# Patient Record
Sex: FEMALE | Race: WHITE | NOT HISPANIC OR LATINO | URBAN - METROPOLITAN AREA
[De-identification: names, ages, dates, MRNs, and addresses within clinical notes are randomized per-mention and may not be internally consistent; named-entity substitution may affect disease eponyms.]

---

## 2017-01-01 ENCOUNTER — EMERGENCY (EMERGENCY)
Facility: HOSPITAL | Age: 32
LOS: 1 days | Discharge: HOME | End: 2017-01-01
Attending: EMERGENCY MEDICINE | Admitting: EMERGENCY MEDICINE
Payer: OTHER MISCELLANEOUS

## 2017-01-01 VITALS
SYSTOLIC BLOOD PRESSURE: 126 MMHG | RESPIRATION RATE: 18 BRPM | OXYGEN SATURATION: 99 % | HEART RATE: 84 BPM | TEMPERATURE: 98 F | DIASTOLIC BLOOD PRESSURE: 73 MMHG

## 2017-01-01 PROCEDURE — 99283 EMERGENCY DEPT VISIT LOW MDM: CPT

## 2017-01-01 NOTE — ED PROVIDER NOTE - ATTENDING CONTRIBUTION TO CARE
Pt was seen and evaluated by me. 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 Pt notes while trying to recap the needle. Pt states she washed out the area immediately. Pt was seen and evaluated by me. 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 Pt notes while trying to recap the needle getting stuck to left 2nd finger. Pt states she washed out the area immediately. Pt was using a 27 dilan needed for a SQ injection. Pt denies any fever, chills, nausea, vomiting, SOB, chest pain, or abd pain. Abrasion noted to left 2nd finger. No active bleeding.

## 2017-01-01 NOTE — ED PROVIDER NOTE - OBJECTIVE STATEMENT
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 Area was immediately washed. 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 Area was immediately washed. Pt used at 27gage needle and gave a SQ injection.

## 2018-07-10 NOTE — ED PROVIDER NOTE - CPE EDP GASTRO NORM
normal... O-T Plasty Text: The defect edges were debeveled with a #15 scalpel blade.  Given the location of the defect, shape of the defect and the proximity to free margins an O-T plasty was deemed most appropriate.  Using a sterile surgical marker, an appropriate O-T plasty was drawn incorporating the defect and placing the expected incisions within the relaxed skin tension lines where possible.    The area thus outlined was incised deep to adipose tissue with a #15 scalpel blade.  The skin margins were undermined to an appropriate distance in all directions utilizing iris scissors.

## 2018-11-01 ENCOUNTER — RESULT REVIEW (OUTPATIENT)
Age: 33
End: 2018-11-01

## 2018-11-26 ENCOUNTER — APPOINTMENT (OUTPATIENT)
Dept: ANTEPARTUM | Facility: CLINIC | Age: 33
End: 2018-11-26
Payer: COMMERCIAL

## 2018-11-26 ENCOUNTER — ASOB RESULT (OUTPATIENT)
Age: 33
End: 2018-11-26

## 2018-11-26 PROCEDURE — 36416 COLLJ CAPILLARY BLOOD SPEC: CPT

## 2018-11-26 PROCEDURE — 76813 OB US NUCHAL MEAS 1 GEST: CPT

## 2018-11-26 PROCEDURE — 76801 OB US < 14 WKS SINGLE FETUS: CPT

## 2019-01-16 ENCOUNTER — ASOB RESULT (OUTPATIENT)
Age: 34
End: 2019-01-16

## 2019-01-16 ENCOUNTER — APPOINTMENT (OUTPATIENT)
Dept: ANTEPARTUM | Facility: CLINIC | Age: 34
End: 2019-01-16
Payer: COMMERCIAL

## 2019-01-16 PROCEDURE — 76817 TRANSVAGINAL US OBSTETRIC: CPT | Mod: 59

## 2019-01-16 PROCEDURE — 76811 OB US DETAILED SNGL FETUS: CPT

## 2019-04-09 ENCOUNTER — OUTPATIENT (OUTPATIENT)
Dept: OUTPATIENT SERVICES | Facility: HOSPITAL | Age: 34
LOS: 1 days | End: 2019-04-09
Payer: COMMERCIAL

## 2019-04-09 DIAGNOSIS — O26.899 OTHER SPECIFIED PREGNANCY RELATED CONDITIONS, UNSPECIFIED TRIMESTER: ICD-10-CM

## 2019-04-09 DIAGNOSIS — Z3A.00 WEEKS OF GESTATION OF PREGNANCY NOT SPECIFIED: ICD-10-CM

## 2019-04-09 LAB
APTT BLD: 25 SEC — LOW (ref 27.5–36.3)
BLD GP AB SCN SERPL QL: NEGATIVE — SIGNIFICANT CHANGE UP
FIBRINOGEN PPP-MCNC: 658 MG/DL — HIGH (ref 350–510)
HCT VFR BLD CALC: 31.9 % — LOW (ref 34.5–45)
HGB BLD-MCNC: 11.7 G/DL — SIGNIFICANT CHANGE UP (ref 11.5–15.5)
INR BLD: 1 RATIO — SIGNIFICANT CHANGE UP (ref 0.88–1.16)
MCHC RBC-ENTMCNC: 32.4 PG — SIGNIFICANT CHANGE UP (ref 27–34)
MCHC RBC-ENTMCNC: 36.7 GM/DL — HIGH (ref 32–36)
MCV RBC AUTO: 88.3 FL — SIGNIFICANT CHANGE UP (ref 80–100)
PLATELET # BLD AUTO: 193 K/UL — SIGNIFICANT CHANGE UP (ref 150–400)
PROTHROM AB SERPL-ACNC: 11.4 SEC — SIGNIFICANT CHANGE UP (ref 10–12.9)
RBC # BLD: 3.62 M/UL — LOW (ref 3.8–5.2)
RBC # FLD: 11.9 % — SIGNIFICANT CHANGE UP (ref 10.3–14.5)
RH IG SCN BLD-IMP: POSITIVE — SIGNIFICANT CHANGE UP
WBC # BLD: 8.3 K/UL — SIGNIFICANT CHANGE UP (ref 3.8–10.5)
WBC # FLD AUTO: 8.3 K/UL — SIGNIFICANT CHANGE UP (ref 3.8–10.5)

## 2019-04-09 PROCEDURE — 59025 FETAL NON-STRESS TEST: CPT

## 2019-04-09 PROCEDURE — 86901 BLOOD TYPING SEROLOGIC RH(D): CPT

## 2019-04-09 PROCEDURE — 86850 RBC ANTIBODY SCREEN: CPT

## 2019-04-09 PROCEDURE — 85027 COMPLETE CBC AUTOMATED: CPT

## 2019-04-09 PROCEDURE — 86900 BLOOD TYPING SEROLOGIC ABO: CPT

## 2019-04-09 PROCEDURE — G0463: CPT

## 2019-04-09 PROCEDURE — 85384 FIBRINOGEN ACTIVITY: CPT

## 2019-04-09 PROCEDURE — 85460 HEMOGLOBIN FETAL: CPT

## 2019-04-09 PROCEDURE — 85610 PROTHROMBIN TIME: CPT

## 2019-04-09 PROCEDURE — 85730 THROMBOPLASTIN TIME PARTIAL: CPT

## 2019-04-10 ENCOUNTER — ASOB RESULT (OUTPATIENT)
Age: 34
End: 2019-04-10

## 2019-04-10 ENCOUNTER — APPOINTMENT (OUTPATIENT)
Dept: ANTEPARTUM | Facility: CLINIC | Age: 34
End: 2019-04-10
Payer: COMMERCIAL

## 2019-04-10 LAB — KLEIHAUER-BETKE CALCULATION: 0 % — SIGNIFICANT CHANGE UP (ref 0–0.3)

## 2019-04-10 PROCEDURE — 76818 FETAL BIOPHYS PROFILE W/NST: CPT | Mod: 26

## 2019-04-10 PROCEDURE — 76816 OB US FOLLOW-UP PER FETUS: CPT | Mod: 26

## 2019-06-11 ENCOUNTER — APPOINTMENT (OUTPATIENT)
Dept: ANTEPARTUM | Facility: CLINIC | Age: 34
End: 2019-06-11
Payer: COMMERCIAL

## 2019-06-11 ENCOUNTER — OUTPATIENT (OUTPATIENT)
Dept: OUTPATIENT SERVICES | Facility: HOSPITAL | Age: 34
LOS: 1 days | End: 2019-06-11
Payer: COMMERCIAL

## 2019-06-11 ENCOUNTER — ASOB RESULT (OUTPATIENT)
Age: 34
End: 2019-06-11

## 2019-06-11 DIAGNOSIS — Z01.818 ENCOUNTER FOR OTHER PREPROCEDURAL EXAMINATION: ICD-10-CM

## 2019-06-11 PROCEDURE — 76818 FETAL BIOPHYS PROFILE W/NST: CPT | Mod: 26

## 2019-06-11 PROCEDURE — 76818 FETAL BIOPHYS PROFILE W/NST: CPT

## 2019-06-11 PROCEDURE — 76816 OB US FOLLOW-UP PER FETUS: CPT

## 2019-06-14 ENCOUNTER — APPOINTMENT (OUTPATIENT)
Dept: ANTEPARTUM | Facility: CLINIC | Age: 34
End: 2019-06-14
Payer: COMMERCIAL

## 2019-06-14 ENCOUNTER — OUTPATIENT (OUTPATIENT)
Dept: OUTPATIENT SERVICES | Facility: HOSPITAL | Age: 34
LOS: 1 days | End: 2019-06-14
Payer: COMMERCIAL

## 2019-06-14 ENCOUNTER — ASOB RESULT (OUTPATIENT)
Age: 34
End: 2019-06-14

## 2019-06-14 DIAGNOSIS — Z01.818 ENCOUNTER FOR OTHER PREPROCEDURAL EXAMINATION: ICD-10-CM

## 2019-06-14 PROCEDURE — 76818 FETAL BIOPHYS PROFILE W/NST: CPT | Mod: 26

## 2019-06-14 PROCEDURE — 76818 FETAL BIOPHYS PROFILE W/NST: CPT

## 2019-06-16 ENCOUNTER — OUTPATIENT (OUTPATIENT)
Dept: OUTPATIENT SERVICES | Facility: HOSPITAL | Age: 34
LOS: 1 days | End: 2019-06-16
Payer: COMMERCIAL

## 2019-06-16 DIAGNOSIS — O26.899 OTHER SPECIFIED PREGNANCY RELATED CONDITIONS, UNSPECIFIED TRIMESTER: ICD-10-CM

## 2019-06-16 DIAGNOSIS — Z3A.00 WEEKS OF GESTATION OF PREGNANCY NOT SPECIFIED: ICD-10-CM

## 2019-06-16 PROCEDURE — G0463: CPT

## 2019-06-16 PROCEDURE — 59025 FETAL NON-STRESS TEST: CPT

## 2019-06-16 PROCEDURE — 59025 FETAL NON-STRESS TEST: CPT | Mod: 26

## 2019-06-17 ENCOUNTER — INPATIENT (INPATIENT)
Facility: HOSPITAL | Age: 34
LOS: 1 days | Discharge: HOME | End: 2019-06-19
Attending: OBSTETRICS & GYNECOLOGY | Admitting: OBSTETRICS & GYNECOLOGY
Payer: COMMERCIAL

## 2019-06-17 VITALS
SYSTOLIC BLOOD PRESSURE: 131 MMHG | TEMPERATURE: 98 F | RESPIRATION RATE: 16 BRPM | HEART RATE: 86 BPM | OXYGEN SATURATION: 98 % | DIASTOLIC BLOOD PRESSURE: 87 MMHG

## 2019-06-17 DIAGNOSIS — Z34.80 ENCOUNTER FOR SUPERVISION OF OTHER NORMAL PREGNANCY, UNSPECIFIED TRIMESTER: ICD-10-CM

## 2019-06-17 DIAGNOSIS — Z3A.00 WEEKS OF GESTATION OF PREGNANCY NOT SPECIFIED: ICD-10-CM

## 2019-06-17 DIAGNOSIS — O26.899 OTHER SPECIFIED PREGNANCY RELATED CONDITIONS, UNSPECIFIED TRIMESTER: ICD-10-CM

## 2019-06-17 LAB
BASOPHILS # BLD AUTO: 0 K/UL — SIGNIFICANT CHANGE UP (ref 0–0.2)
BASOPHILS NFR BLD AUTO: 0.2 % — SIGNIFICANT CHANGE UP (ref 0–2)
BLD GP AB SCN SERPL QL: NEGATIVE — SIGNIFICANT CHANGE UP
EOSINOPHIL # BLD AUTO: 0 K/UL — SIGNIFICANT CHANGE UP (ref 0–0.5)
EOSINOPHIL NFR BLD AUTO: 0.3 % — SIGNIFICANT CHANGE UP (ref 0–6)
HCT VFR BLD CALC: 36.3 % — SIGNIFICANT CHANGE UP (ref 34.5–45)
HGB BLD-MCNC: 12.6 G/DL — SIGNIFICANT CHANGE UP (ref 11.5–15.5)
LYMPHOCYTES # BLD AUTO: 15.4 % — SIGNIFICANT CHANGE UP (ref 13–44)
LYMPHOCYTES # BLD AUTO: 2.1 K/UL — SIGNIFICANT CHANGE UP (ref 1–3.3)
MCHC RBC-ENTMCNC: 29 PG — SIGNIFICANT CHANGE UP (ref 27–34)
MCHC RBC-ENTMCNC: 34.7 GM/DL — SIGNIFICANT CHANGE UP (ref 32–36)
MCV RBC AUTO: 83.5 FL — SIGNIFICANT CHANGE UP (ref 80–100)
MONOCYTES # BLD AUTO: 0.8 K/UL — SIGNIFICANT CHANGE UP (ref 0–0.9)
MONOCYTES NFR BLD AUTO: 5.6 % — SIGNIFICANT CHANGE UP (ref 2–14)
NEUTROPHILS # BLD AUTO: 10.6 K/UL — HIGH (ref 1.8–7.4)
NEUTROPHILS NFR BLD AUTO: 78.5 % — HIGH (ref 43–77)
PLATELET # BLD AUTO: 175 K/UL — SIGNIFICANT CHANGE UP (ref 150–400)
RBC # BLD: 4.35 M/UL — SIGNIFICANT CHANGE UP (ref 3.8–5.2)
RBC # FLD: 13.7 % — SIGNIFICANT CHANGE UP (ref 10.3–14.5)
RH IG SCN BLD-IMP: POSITIVE — SIGNIFICANT CHANGE UP
WBC # BLD: 13.5 K/UL — HIGH (ref 3.8–10.5)
WBC # FLD AUTO: 13.5 K/UL — HIGH (ref 3.8–10.5)

## 2019-06-17 RX ORDER — AMPICILLIN TRIHYDRATE 250 MG
1 CAPSULE ORAL EVERY 4 HOURS
Refills: 0 | Status: DISCONTINUED | OUTPATIENT
Start: 2019-06-17 | End: 2019-06-17

## 2019-06-17 RX ORDER — MAGNESIUM HYDROXIDE 400 MG/1
30 TABLET, CHEWABLE ORAL
Refills: 0 | Status: DISCONTINUED | OUTPATIENT
Start: 2019-06-17 | End: 2019-06-19

## 2019-06-17 RX ORDER — SODIUM CHLORIDE 9 MG/ML
1000 INJECTION, SOLUTION INTRAVENOUS
Refills: 0 | Status: DISCONTINUED | OUTPATIENT
Start: 2019-06-17 | End: 2019-06-17

## 2019-06-17 RX ORDER — LANOLIN
1 OINTMENT (GRAM) TOPICAL EVERY 6 HOURS
Refills: 0 | Status: DISCONTINUED | OUTPATIENT
Start: 2019-06-17 | End: 2019-06-19

## 2019-06-17 RX ORDER — SIMETHICONE 80 MG/1
80 TABLET, CHEWABLE ORAL EVERY 4 HOURS
Refills: 0 | Status: DISCONTINUED | OUTPATIENT
Start: 2019-06-17 | End: 2019-06-19

## 2019-06-17 RX ORDER — DOCUSATE SODIUM 100 MG
100 CAPSULE ORAL
Refills: 0 | Status: DISCONTINUED | OUTPATIENT
Start: 2019-06-17 | End: 2019-06-19

## 2019-06-17 RX ORDER — GLYCERIN ADULT
1 SUPPOSITORY, RECTAL RECTAL AT BEDTIME
Refills: 0 | Status: DISCONTINUED | OUTPATIENT
Start: 2019-06-17 | End: 2019-06-19

## 2019-06-17 RX ORDER — IBUPROFEN 200 MG
600 TABLET ORAL EVERY 6 HOURS
Refills: 0 | Status: COMPLETED | OUTPATIENT
Start: 2019-06-17 | End: 2020-05-15

## 2019-06-17 RX ORDER — AER TRAVELER 0.5 G/1
1 SOLUTION RECTAL; TOPICAL EVERY 4 HOURS
Refills: 0 | Status: DISCONTINUED | OUTPATIENT
Start: 2019-06-17 | End: 2019-06-19

## 2019-06-17 RX ORDER — OXYTOCIN 10 UNIT/ML
333.33 VIAL (ML) INJECTION
Qty: 20 | Refills: 0 | Status: DISCONTINUED | OUTPATIENT
Start: 2019-06-17 | End: 2019-06-17

## 2019-06-17 RX ORDER — DIPHENHYDRAMINE HCL 50 MG
25 CAPSULE ORAL EVERY 6 HOURS
Refills: 0 | Status: DISCONTINUED | OUTPATIENT
Start: 2019-06-17 | End: 2019-06-19

## 2019-06-17 RX ORDER — ACETAMINOPHEN 500 MG
975 TABLET ORAL EVERY 6 HOURS
Refills: 0 | Status: DISCONTINUED | OUTPATIENT
Start: 2019-06-17 | End: 2019-06-19

## 2019-06-17 RX ORDER — IBUPROFEN 200 MG
600 TABLET ORAL EVERY 6 HOURS
Refills: 0 | Status: DISCONTINUED | OUTPATIENT
Start: 2019-06-17 | End: 2019-06-19

## 2019-06-17 RX ORDER — BENZOCAINE 10 %
1 GEL (GRAM) MUCOUS MEMBRANE EVERY 6 HOURS
Refills: 0 | Status: DISCONTINUED | OUTPATIENT
Start: 2019-06-17 | End: 2019-06-19

## 2019-06-17 RX ORDER — DIBUCAINE 1 %
1 OINTMENT (GRAM) RECTAL EVERY 6 HOURS
Refills: 0 | Status: DISCONTINUED | OUTPATIENT
Start: 2019-06-17 | End: 2019-06-19

## 2019-06-17 RX ORDER — OXYTOCIN 10 UNIT/ML
333.33 VIAL (ML) INJECTION
Qty: 20 | Refills: 0 | Status: DISCONTINUED | OUTPATIENT
Start: 2019-06-17 | End: 2019-06-19

## 2019-06-17 RX ORDER — KETOROLAC TROMETHAMINE 30 MG/ML
30 SYRINGE (ML) INJECTION ONCE
Refills: 0 | Status: DISCONTINUED | OUTPATIENT
Start: 2019-06-17 | End: 2019-06-17

## 2019-06-17 RX ORDER — OXYCODONE HYDROCHLORIDE 5 MG/1
5 TABLET ORAL
Refills: 0 | Status: DISCONTINUED | OUTPATIENT
Start: 2019-06-17 | End: 2019-06-19

## 2019-06-17 RX ORDER — AMPICILLIN TRIHYDRATE 250 MG
2 CAPSULE ORAL ONCE
Refills: 0 | Status: COMPLETED | OUTPATIENT
Start: 2019-06-17 | End: 2019-06-17

## 2019-06-17 RX ORDER — TETANUS TOXOID, REDUCED DIPHTHERIA TOXOID AND ACELLULAR PERTUSSIS VACCINE, ADSORBED 5; 2.5; 8; 8; 2.5 [IU]/.5ML; [IU]/.5ML; UG/.5ML; UG/.5ML; UG/.5ML
0.5 SUSPENSION INTRAMUSCULAR ONCE
Refills: 0 | Status: DISCONTINUED | OUTPATIENT
Start: 2019-06-17 | End: 2019-06-19

## 2019-06-17 RX ORDER — CITRIC ACID/SODIUM CITRATE 300-500 MG
15 SOLUTION, ORAL ORAL EVERY 6 HOURS
Refills: 0 | Status: DISCONTINUED | OUTPATIENT
Start: 2019-06-17 | End: 2019-06-17

## 2019-06-17 RX ORDER — HYDROCORTISONE 1 %
1 OINTMENT (GRAM) TOPICAL EVERY 6 HOURS
Refills: 0 | Status: DISCONTINUED | OUTPATIENT
Start: 2019-06-17 | End: 2019-06-19

## 2019-06-17 RX ORDER — SODIUM CHLORIDE 9 MG/ML
3 INJECTION INTRAMUSCULAR; INTRAVENOUS; SUBCUTANEOUS EVERY 8 HOURS
Refills: 0 | Status: DISCONTINUED | OUTPATIENT
Start: 2019-06-17 | End: 2019-06-19

## 2019-06-17 RX ORDER — PRAMOXINE HYDROCHLORIDE 150 MG/15G
1 AEROSOL, FOAM RECTAL EVERY 4 HOURS
Refills: 0 | Status: DISCONTINUED | OUTPATIENT
Start: 2019-06-17 | End: 2019-06-19

## 2019-06-17 RX ORDER — OXYCODONE HYDROCHLORIDE 5 MG/1
5 TABLET ORAL ONCE
Refills: 0 | Status: DISCONTINUED | OUTPATIENT
Start: 2019-06-17 | End: 2019-06-19

## 2019-06-17 RX ADMIN — Medication 1000 MILLIUNIT(S)/MIN: at 16:35

## 2019-06-17 RX ADMIN — SODIUM CHLORIDE 125 MILLILITER(S): 9 INJECTION, SOLUTION INTRAVENOUS at 16:20

## 2019-06-17 RX ADMIN — Medication 975 MILLIGRAM(S): at 22:53

## 2019-06-17 RX ADMIN — Medication 0.2 MILLIGRAM(S): at 16:50

## 2019-06-17 RX ADMIN — Medication 975 MILLIGRAM(S): at 23:30

## 2019-06-17 RX ADMIN — Medication 30 MILLIGRAM(S): at 19:53

## 2019-06-17 RX ADMIN — Medication 216 GRAM(S): at 16:17

## 2019-06-18 ENCOUNTER — APPOINTMENT (OUTPATIENT)
Dept: ANTEPARTUM | Facility: CLINIC | Age: 34
End: 2019-06-18

## 2019-06-18 LAB
HCT VFR BLD CALC: 28.5 % — LOW (ref 34.5–45)
HGB BLD-MCNC: 9.3 G/DL — LOW (ref 11.5–15.5)
T PALLIDUM AB TITR SER: NEGATIVE — SIGNIFICANT CHANGE UP

## 2019-06-18 RX ADMIN — Medication 600 MILLIGRAM(S): at 09:04

## 2019-06-18 RX ADMIN — Medication 600 MILLIGRAM(S): at 20:50

## 2019-06-18 RX ADMIN — Medication 975 MILLIGRAM(S): at 18:35

## 2019-06-18 RX ADMIN — Medication 600 MILLIGRAM(S): at 15:16

## 2019-06-18 RX ADMIN — Medication 600 MILLIGRAM(S): at 15:46

## 2019-06-18 RX ADMIN — Medication 600 MILLIGRAM(S): at 21:50

## 2019-06-18 RX ADMIN — Medication 1 TABLET(S): at 12:24

## 2019-06-18 RX ADMIN — Medication 975 MILLIGRAM(S): at 20:30

## 2019-06-18 RX ADMIN — Medication 600 MILLIGRAM(S): at 09:34

## 2019-06-18 RX ADMIN — Medication 975 MILLIGRAM(S): at 12:24

## 2019-06-18 RX ADMIN — Medication 975 MILLIGRAM(S): at 12:54

## 2019-06-18 NOTE — PROGRESS NOTE ADULT - SUBJECTIVE AND OBJECTIVE BOX
VS: Vital Signs Last 24 Hrs  T(C): 36.9 (18 Jun 2019 05:00), Max: 36.9 (18 Jun 2019 05:00)  T(F): 98.4 (18 Jun 2019 05:00), Max: 98.4 (18 Jun 2019 05:00)  HR: 75 (18 Jun 2019 05:00) (75 - 95)  BP: 105/66 (18 Jun 2019 05:00) (100/65 - 131/87)  BP(mean): --  RR: 18 (18 Jun 2019 05:00) (16 - 18)  SpO2: 97% (18 Jun 2019 05:00) (97% - 99%)    Abdomen soft, fundus firm  Lochia WNL  Voiding, stable

## 2019-06-19 ENCOUNTER — TRANSCRIPTION ENCOUNTER (OUTPATIENT)
Age: 34
End: 2019-06-19

## 2019-06-19 VITALS
HEART RATE: 68 BPM | TEMPERATURE: 98 F | SYSTOLIC BLOOD PRESSURE: 108 MMHG | OXYGEN SATURATION: 98 % | DIASTOLIC BLOOD PRESSURE: 69 MMHG | RESPIRATION RATE: 18 BRPM

## 2019-06-19 PROCEDURE — 86900 BLOOD TYPING SEROLOGIC ABO: CPT

## 2019-06-19 PROCEDURE — 86780 TREPONEMA PALLIDUM: CPT

## 2019-06-19 PROCEDURE — 85027 COMPLETE CBC AUTOMATED: CPT

## 2019-06-19 PROCEDURE — 59025 FETAL NON-STRESS TEST: CPT

## 2019-06-19 PROCEDURE — 90707 MMR VACCINE SC: CPT

## 2019-06-19 PROCEDURE — 85014 HEMATOCRIT: CPT

## 2019-06-19 PROCEDURE — 59050 FETAL MONITOR W/REPORT: CPT

## 2019-06-19 PROCEDURE — G0463: CPT

## 2019-06-19 PROCEDURE — 86901 BLOOD TYPING SEROLOGIC RH(D): CPT

## 2019-06-19 PROCEDURE — 86850 RBC ANTIBODY SCREEN: CPT

## 2019-06-19 PROCEDURE — 85018 HEMOGLOBIN: CPT

## 2019-06-19 RX ADMIN — Medication 600 MILLIGRAM(S): at 13:20

## 2019-06-19 RX ADMIN — Medication 600 MILLIGRAM(S): at 13:12

## 2019-06-19 RX ADMIN — Medication 600 MILLIGRAM(S): at 05:20

## 2019-06-19 RX ADMIN — Medication 600 MILLIGRAM(S): at 04:20

## 2019-06-19 RX ADMIN — Medication 1 TABLET(S): at 13:12

## 2019-06-19 RX ADMIN — Medication 0.5 MILLILITER(S): at 11:52

## 2019-06-19 NOTE — PROGRESS NOTE ADULT - SUBJECTIVE AND OBJECTIVE BOX
VS: Vital Signs Last 24 Hrs  T(C): 36.7 (19 Jun 2019 05:00), Max: 36.8 (18 Jun 2019 18:00)  T(F): 98 (19 Jun 2019 05:00), Max: 98.2 (18 Jun 2019 18:00)  HR: 68 (19 Jun 2019 05:00) (68 - 87)  BP: 108/69 (19 Jun 2019 05:00) (103/64 - 117/73)  BP(mean): --  RR: 18 (19 Jun 2019 05:00) (18 - 18)  SpO2: 98% (19 Jun 2019 05:00) (98% - 99%)    Abdomen soft, fundus firm  Lochia WNL  Voiding, stable

## 2019-06-19 NOTE — DISCHARGE NOTE OB - PATIENT PORTAL LINK FT
You can access the [facility] Patient Portal, offered by [facility], by registering with the following website: [email or web address]

## 2019-06-19 NOTE — DISCHARGE NOTE OB - MATERIALS PROVIDED
Shaken Baby Prevention Handout/Breastfeeding Guide and Packet/Birth Certificate Instructions/[facility]  Screening Program/  Immunization Record/[facility] Hearing Screen Program/Breastfeeding Mother’s Support Group Information/Guide to Postpartum Care/Vaccinations/Breastfeeding Log/Back To Sleep Handout/Discharge Medication Information for Patients and Families Pocket Guide

## 2019-06-19 NOTE — DISCHARGE NOTE OB - CARE PROVIDER_API CALL
Thanks vimal
That seems like a reasonable dose adjustment.
Was taking 5 daily  Now 5 mg X 6, & 7.5 X 1  Labs 2 weeks
Josselin Melton)  Obstetrics and Gynecology  [address], [address]  [place] [address]  Phone: [phone]  Fax: [phone]  Follow Up Time:

## 2020-01-14 ENCOUNTER — RESULT REVIEW (OUTPATIENT)
Age: 35
End: 2020-01-14

## 2020-03-31 ENCOUNTER — TRANSCRIPTION ENCOUNTER (OUTPATIENT)
Age: 35
End: 2020-03-31

## 2020-04-25 ENCOUNTER — MESSAGE (OUTPATIENT)
Age: 35
End: 2020-04-25

## 2021-04-02 RX ORDER — AZITHROMYCIN 250 MG/1
250 TABLET, FILM COATED ORAL
Qty: 1 | Refills: 0 | Status: ACTIVE | COMMUNITY
Start: 2021-04-02 | End: 1900-01-01

## 2021-04-08 ENCOUNTER — RESULT REVIEW (OUTPATIENT)
Age: 36
End: 2021-04-08

## 2021-05-05 ENCOUNTER — APPOINTMENT (OUTPATIENT)
Dept: ANTEPARTUM | Facility: CLINIC | Age: 36
End: 2021-05-05
Payer: COMMERCIAL

## 2021-05-05 ENCOUNTER — ASOB RESULT (OUTPATIENT)
Age: 36
End: 2021-05-05

## 2021-05-05 PROCEDURE — 76813 OB US NUCHAL MEAS 1 GEST: CPT

## 2021-05-05 PROCEDURE — 76801 OB US < 14 WKS SINGLE FETUS: CPT

## 2021-05-05 PROCEDURE — 36416 COLLJ CAPILLARY BLOOD SPEC: CPT

## 2021-05-05 PROCEDURE — 99072 ADDL SUPL MATRL&STAF TM PHE: CPT

## 2021-06-29 ENCOUNTER — APPOINTMENT (OUTPATIENT)
Dept: ANTEPARTUM | Facility: CLINIC | Age: 36
End: 2021-06-29
Payer: COMMERCIAL

## 2021-06-29 ENCOUNTER — APPOINTMENT (OUTPATIENT)
Dept: PEDIATRIC CARDIOLOGY | Facility: CLINIC | Age: 36
End: 2021-06-29
Payer: COMMERCIAL

## 2021-06-29 ENCOUNTER — ASOB RESULT (OUTPATIENT)
Age: 36
End: 2021-06-29

## 2021-06-29 PROCEDURE — 99072 ADDL SUPL MATRL&STAF TM PHE: CPT

## 2021-06-29 PROCEDURE — 93325 DOPPLER ECHO COLOR FLOW MAPG: CPT | Mod: 59

## 2021-06-29 PROCEDURE — 76820 UMBILICAL ARTERY ECHO: CPT

## 2021-06-29 PROCEDURE — 76827 ECHO EXAM OF FETAL HEART: CPT

## 2021-06-29 PROCEDURE — 76825 ECHO EXAM OF FETAL HEART: CPT

## 2021-06-29 PROCEDURE — 99202 OFFICE O/P NEW SF 15 MIN: CPT

## 2021-06-29 PROCEDURE — 76811 OB US DETAILED SNGL FETUS: CPT

## 2021-07-07 ENCOUNTER — APPOINTMENT (OUTPATIENT)
Dept: ANTEPARTUM | Facility: CLINIC | Age: 36
End: 2021-07-07

## 2021-08-18 ENCOUNTER — ASOB RESULT (OUTPATIENT)
Age: 36
End: 2021-08-18

## 2021-08-18 ENCOUNTER — APPOINTMENT (OUTPATIENT)
Dept: MATERNAL FETAL MEDICINE | Facility: CLINIC | Age: 36
End: 2021-08-18
Payer: COMMERCIAL

## 2021-08-18 DIAGNOSIS — O24.419 GESTATIONAL DIABETES MELLITUS IN PREGNANCY, UNSPECIFIED CONTROL: ICD-10-CM

## 2021-08-18 PROCEDURE — G0109 DIAB MANAGE TRN IND/GROUP: CPT | Mod: 95

## 2021-08-18 RX ORDER — URINE ACETONE TEST STRIPS
STRIP MISCELLANEOUS
Qty: 1 | Refills: 0 | Status: ACTIVE | COMMUNITY
Start: 2021-08-18 | End: 1900-01-01

## 2021-08-18 RX ORDER — BLOOD-GLUCOSE METER
W/DEVICE KIT MISCELLANEOUS
Qty: 1 | Refills: 0 | Status: ACTIVE | COMMUNITY
Start: 2021-08-18 | End: 1900-01-01

## 2021-08-18 RX ORDER — LANCETS 33 GAUGE
EACH MISCELLANEOUS
Qty: 4 | Refills: 2 | Status: ACTIVE | COMMUNITY
Start: 2021-08-18 | End: 1900-01-01

## 2021-08-18 RX ORDER — BLOOD-GLUCOSE METER
KIT MISCELLANEOUS 4 TIMES DAILY
Qty: 4 | Refills: 1 | Status: ACTIVE | COMMUNITY
Start: 2021-08-18 | End: 1900-01-01

## 2021-08-27 ENCOUNTER — NON-APPOINTMENT (OUTPATIENT)
Age: 36
End: 2021-08-27

## 2021-09-02 ENCOUNTER — APPOINTMENT (OUTPATIENT)
Dept: MATERNAL FETAL MEDICINE | Facility: CLINIC | Age: 36
End: 2021-09-02
Payer: COMMERCIAL

## 2021-09-02 ENCOUNTER — ASOB RESULT (OUTPATIENT)
Age: 36
End: 2021-09-02

## 2021-09-02 PROCEDURE — G0108 DIAB MANAGE TRN  PER INDIV: CPT | Mod: 95

## 2021-09-30 ENCOUNTER — ASOB RESULT (OUTPATIENT)
Age: 36
End: 2021-09-30

## 2021-09-30 ENCOUNTER — APPOINTMENT (OUTPATIENT)
Dept: MATERNAL FETAL MEDICINE | Facility: CLINIC | Age: 36
End: 2021-09-30
Payer: COMMERCIAL

## 2021-09-30 PROCEDURE — G0108 DIAB MANAGE TRN  PER INDIV: CPT | Mod: 95

## 2021-10-20 ENCOUNTER — ASOB RESULT (OUTPATIENT)
Age: 36
End: 2021-10-20

## 2021-10-20 ENCOUNTER — APPOINTMENT (OUTPATIENT)
Dept: ANTEPARTUM | Facility: CLINIC | Age: 36
End: 2021-10-20
Payer: COMMERCIAL

## 2021-10-20 PROCEDURE — 76816 OB US FOLLOW-UP PER FETUS: CPT

## 2021-10-21 ENCOUNTER — APPOINTMENT (OUTPATIENT)
Dept: MATERNAL FETAL MEDICINE | Facility: CLINIC | Age: 36
End: 2021-10-21
Payer: COMMERCIAL

## 2021-10-21 ENCOUNTER — ASOB RESULT (OUTPATIENT)
Age: 36
End: 2021-10-21

## 2021-10-21 PROCEDURE — G0108 DIAB MANAGE TRN  PER INDIV: CPT | Mod: 95

## 2021-11-16 ENCOUNTER — INPATIENT (INPATIENT)
Facility: HOSPITAL | Age: 36
LOS: 1 days | Discharge: HOME | End: 2021-11-18
Attending: OBSTETRICS & GYNECOLOGY | Admitting: OBSTETRICS & GYNECOLOGY
Payer: COMMERCIAL

## 2021-11-16 VITALS — HEART RATE: 104 BPM | TEMPERATURE: 98 F | RESPIRATION RATE: 16 BRPM

## 2021-11-16 DIAGNOSIS — O26.899 OTHER SPECIFIED PREGNANCY RELATED CONDITIONS, UNSPECIFIED TRIMESTER: ICD-10-CM

## 2021-11-16 DIAGNOSIS — Z34.90 ENCOUNTER FOR SUPERVISION OF NORMAL PREGNANCY, UNSPECIFIED, UNSPECIFIED TRIMESTER: ICD-10-CM

## 2021-11-16 DIAGNOSIS — Z34.80 ENCOUNTER FOR SUPERVISION OF OTHER NORMAL PREGNANCY, UNSPECIFIED TRIMESTER: ICD-10-CM

## 2021-11-16 DIAGNOSIS — Z3A.00 WEEKS OF GESTATION OF PREGNANCY NOT SPECIFIED: ICD-10-CM

## 2021-11-16 LAB
BASOPHILS # BLD AUTO: 0.03 K/UL — SIGNIFICANT CHANGE UP (ref 0–0.2)
BASOPHILS NFR BLD AUTO: 0.3 % — SIGNIFICANT CHANGE UP (ref 0–2)
BLD GP AB SCN SERPL QL: NEGATIVE — SIGNIFICANT CHANGE UP
COVID-19 SPIKE DOMAIN AB INTERP: POSITIVE
COVID-19 SPIKE DOMAIN ANTIBODY RESULT: >250 U/ML — HIGH
EOSINOPHIL # BLD AUTO: 0.09 K/UL — SIGNIFICANT CHANGE UP (ref 0–0.5)
EOSINOPHIL NFR BLD AUTO: 0.9 % — SIGNIFICANT CHANGE UP (ref 0–6)
GLUCOSE BLDC GLUCOMTR-MCNC: 108 MG/DL — HIGH (ref 70–99)
GLUCOSE BLDC GLUCOMTR-MCNC: 92 MG/DL — SIGNIFICANT CHANGE UP (ref 70–99)
GLUCOSE BLDC GLUCOMTR-MCNC: 98 MG/DL — SIGNIFICANT CHANGE UP (ref 70–99)
HCT VFR BLD CALC: 33.8 % — LOW (ref 34.5–45)
HGB BLD-MCNC: 11 G/DL — LOW (ref 11.5–15.5)
IMM GRANULOCYTES NFR BLD AUTO: 0.9 % — SIGNIFICANT CHANGE UP (ref 0–1.5)
LYMPHOCYTES # BLD AUTO: 2.14 K/UL — SIGNIFICANT CHANGE UP (ref 1–3.3)
LYMPHOCYTES # BLD AUTO: 21.2 % — SIGNIFICANT CHANGE UP (ref 13–44)
MCHC RBC-ENTMCNC: 27.4 PG — SIGNIFICANT CHANGE UP (ref 27–34)
MCHC RBC-ENTMCNC: 32.5 GM/DL — SIGNIFICANT CHANGE UP (ref 32–36)
MCV RBC AUTO: 84.3 FL — SIGNIFICANT CHANGE UP (ref 80–100)
MONOCYTES # BLD AUTO: 0.88 K/UL — SIGNIFICANT CHANGE UP (ref 0–0.9)
MONOCYTES NFR BLD AUTO: 8.7 % — SIGNIFICANT CHANGE UP (ref 2–14)
NEUTROPHILS # BLD AUTO: 6.85 K/UL — SIGNIFICANT CHANGE UP (ref 1.8–7.4)
NEUTROPHILS NFR BLD AUTO: 68 % — SIGNIFICANT CHANGE UP (ref 43–77)
NRBC # BLD: 0 /100 WBCS — SIGNIFICANT CHANGE UP (ref 0–0)
PLATELET # BLD AUTO: 193 K/UL — SIGNIFICANT CHANGE UP (ref 150–400)
RBC # BLD: 4.01 M/UL — SIGNIFICANT CHANGE UP (ref 3.8–5.2)
RBC # FLD: 13.7 % — SIGNIFICANT CHANGE UP (ref 10.3–14.5)
RH IG SCN BLD-IMP: POSITIVE — SIGNIFICANT CHANGE UP
RH IG SCN BLD-IMP: POSITIVE — SIGNIFICANT CHANGE UP
SARS-COV-2 IGG+IGM SERPL QL IA: >250 U/ML — HIGH
SARS-COV-2 IGG+IGM SERPL QL IA: POSITIVE
SARS-COV-2 RNA SPEC QL NAA+PROBE: SIGNIFICANT CHANGE UP
T PALLIDUM AB TITR SER: NEGATIVE — SIGNIFICANT CHANGE UP
WBC # BLD: 10.08 K/UL — SIGNIFICANT CHANGE UP (ref 3.8–10.5)
WBC # FLD AUTO: 10.08 K/UL — SIGNIFICANT CHANGE UP (ref 3.8–10.5)

## 2021-11-16 RX ORDER — LANOLIN
1 OINTMENT (GRAM) TOPICAL EVERY 6 HOURS
Refills: 0 | Status: DISCONTINUED | OUTPATIENT
Start: 2021-11-16 | End: 2021-11-18

## 2021-11-16 RX ORDER — CITRIC ACID/SODIUM CITRATE 300-500 MG
15 SOLUTION, ORAL ORAL EVERY 6 HOURS
Refills: 0 | Status: DISCONTINUED | OUTPATIENT
Start: 2021-11-16 | End: 2021-11-16

## 2021-11-16 RX ORDER — OXYTOCIN 10 UNIT/ML
333.33 VIAL (ML) INJECTION
Qty: 20 | Refills: 0 | Status: DISCONTINUED | OUTPATIENT
Start: 2021-11-16 | End: 2021-11-18

## 2021-11-16 RX ORDER — SODIUM CHLORIDE 9 MG/ML
1000 INJECTION INTRAMUSCULAR; INTRAVENOUS; SUBCUTANEOUS
Refills: 0 | Status: DISCONTINUED | OUTPATIENT
Start: 2021-11-16 | End: 2021-11-18

## 2021-11-16 RX ORDER — SODIUM CHLORIDE 9 MG/ML
1000 INJECTION, SOLUTION INTRAVENOUS
Refills: 0 | Status: DISCONTINUED | OUTPATIENT
Start: 2021-11-16 | End: 2021-11-16

## 2021-11-16 RX ORDER — SODIUM CHLORIDE 9 MG/ML
1000 INJECTION, SOLUTION INTRAVENOUS
Refills: 0 | Status: DISCONTINUED | OUTPATIENT
Start: 2021-11-16 | End: 2021-11-18

## 2021-11-16 RX ORDER — AMPICILLIN TRIHYDRATE 250 MG
2 CAPSULE ORAL ONCE
Refills: 0 | Status: COMPLETED | OUTPATIENT
Start: 2021-11-16 | End: 2021-11-16

## 2021-11-16 RX ORDER — SODIUM CHLORIDE 9 MG/ML
3 INJECTION INTRAMUSCULAR; INTRAVENOUS; SUBCUTANEOUS EVERY 8 HOURS
Refills: 0 | Status: DISCONTINUED | OUTPATIENT
Start: 2021-11-16 | End: 2021-11-18

## 2021-11-16 RX ORDER — ACETAMINOPHEN 500 MG
975 TABLET ORAL
Refills: 0 | Status: DISCONTINUED | OUTPATIENT
Start: 2021-11-16 | End: 2021-11-18

## 2021-11-16 RX ORDER — AMPICILLIN TRIHYDRATE 250 MG
1 CAPSULE ORAL EVERY 4 HOURS
Refills: 0 | Status: DISCONTINUED | OUTPATIENT
Start: 2021-11-16 | End: 2021-11-16

## 2021-11-16 RX ORDER — HYDROCORTISONE 1 %
1 OINTMENT (GRAM) TOPICAL EVERY 6 HOURS
Refills: 0 | Status: DISCONTINUED | OUTPATIENT
Start: 2021-11-16 | End: 2021-11-18

## 2021-11-16 RX ORDER — MAGNESIUM HYDROXIDE 400 MG/1
30 TABLET, CHEWABLE ORAL
Refills: 0 | Status: DISCONTINUED | OUTPATIENT
Start: 2021-11-16 | End: 2021-11-18

## 2021-11-16 RX ORDER — AER TRAVELER 0.5 G/1
1 SOLUTION RECTAL; TOPICAL EVERY 4 HOURS
Refills: 0 | Status: DISCONTINUED | OUTPATIENT
Start: 2021-11-16 | End: 2021-11-18

## 2021-11-16 RX ORDER — TETANUS TOXOID, REDUCED DIPHTHERIA TOXOID AND ACELLULAR PERTUSSIS VACCINE, ADSORBED 5; 2.5; 8; 8; 2.5 [IU]/.5ML; [IU]/.5ML; UG/.5ML; UG/.5ML; UG/.5ML
0.5 SUSPENSION INTRAMUSCULAR ONCE
Refills: 0 | Status: DISCONTINUED | OUTPATIENT
Start: 2021-11-16 | End: 2021-11-18

## 2021-11-16 RX ORDER — OXYTOCIN 10 UNIT/ML
4 VIAL (ML) INJECTION
Qty: 30 | Refills: 0 | Status: DISCONTINUED | OUTPATIENT
Start: 2021-11-16 | End: 2021-11-16

## 2021-11-16 RX ORDER — PRAMOXINE HYDROCHLORIDE 150 MG/15G
1 AEROSOL, FOAM RECTAL EVERY 4 HOURS
Refills: 0 | Status: DISCONTINUED | OUTPATIENT
Start: 2021-11-16 | End: 2021-11-18

## 2021-11-16 RX ORDER — DIBUCAINE 1 %
1 OINTMENT (GRAM) RECTAL EVERY 6 HOURS
Refills: 0 | Status: DISCONTINUED | OUTPATIENT
Start: 2021-11-16 | End: 2021-11-18

## 2021-11-16 RX ORDER — BENZOCAINE 10 %
1 GEL (GRAM) MUCOUS MEMBRANE EVERY 6 HOURS
Refills: 0 | Status: DISCONTINUED | OUTPATIENT
Start: 2021-11-16 | End: 2021-11-18

## 2021-11-16 RX ORDER — SIMETHICONE 80 MG/1
80 TABLET, CHEWABLE ORAL EVERY 4 HOURS
Refills: 0 | Status: DISCONTINUED | OUTPATIENT
Start: 2021-11-16 | End: 2021-11-18

## 2021-11-16 RX ORDER — IBUPROFEN 200 MG
600 TABLET ORAL EVERY 6 HOURS
Refills: 0 | Status: COMPLETED | OUTPATIENT
Start: 2021-11-16 | End: 2022-10-15

## 2021-11-16 RX ORDER — DIPHENHYDRAMINE HCL 50 MG
25 CAPSULE ORAL EVERY 6 HOURS
Refills: 0 | Status: DISCONTINUED | OUTPATIENT
Start: 2021-11-16 | End: 2021-11-18

## 2021-11-16 RX ORDER — IBUPROFEN 200 MG
600 TABLET ORAL EVERY 6 HOURS
Refills: 0 | Status: DISCONTINUED | OUTPATIENT
Start: 2021-11-16 | End: 2021-11-18

## 2021-11-16 RX ORDER — KETOROLAC TROMETHAMINE 30 MG/ML
30 SYRINGE (ML) INJECTION ONCE
Refills: 0 | Status: DISCONTINUED | OUTPATIENT
Start: 2021-11-16 | End: 2021-11-16

## 2021-11-16 RX ADMIN — Medication 4 MILLIUNIT(S)/MIN: at 04:30

## 2021-11-16 RX ADMIN — Medication 216 GRAM(S): at 02:42

## 2021-11-16 RX ADMIN — Medication 108 GRAM(S): at 06:42

## 2021-11-16 RX ADMIN — Medication 600 MILLIGRAM(S): at 19:05

## 2021-11-16 RX ADMIN — Medication 975 MILLIGRAM(S): at 20:32

## 2021-11-16 RX ADMIN — SODIUM CHLORIDE 125 MILLILITER(S): 9 INJECTION INTRAMUSCULAR; INTRAVENOUS; SUBCUTANEOUS at 03:21

## 2021-11-16 RX ADMIN — Medication 975 MILLIGRAM(S): at 16:00

## 2021-11-16 RX ADMIN — Medication 975 MILLIGRAM(S): at 20:02

## 2021-11-16 RX ADMIN — Medication 600 MILLIGRAM(S): at 18:38

## 2021-11-16 RX ADMIN — Medication 975 MILLIGRAM(S): at 15:58

## 2021-11-16 RX ADMIN — Medication 30 MILLIGRAM(S): at 12:43

## 2021-11-16 NOTE — OB RN DELIVERY SUMMARY - NS_PLACENTDISPOA_OBGYN_ALL_OB
Continued Stay Note  [facility]     Patient Name: Tashia Leon  MRN: [id number]  Today's Date: 12/12/2018    Admit Date: 11/26/2018    Discharge Plan     Row Name 12/12/18 1529       Plan    Plan Comments  Per Cara w/ [facility], the cost for IV Invanz, at the current dose and anticipated course, is $0. They are not able to provide education to patient and her son in the home and their office will be closed tomorrow. An infusion nurse will be at the hospital in the morning to provide teaching prior to discharge. D/w ID and APRN. CM following.     Row Name 12/12/18 3867       Plan    Plan  Home w/ daily IV infusions    Patient/Family in Agreement with Plan  yes    Plan Comments  CM consulted to arrange daily IV invanz, PO zyvox, weekly groshong care and dressing changes, and weekly labs. Spoke with Cara at [facility], based out of [place], and they are in network with patient's insurance. Referral faxed and they will run the Invanz for cost. [facility] will provide medications, supplies, and education to patient and family once patient is discharged. A PA is needed for Zyvox and this has been initiated through [employer]. Once a PA is received, [facility] pharmacy will run the medication to determine cost. PA currently pending. CM called all 4  agencies available in [facility] and none are in network with patient's insurance. Spoke with Katiana at [facility] and Dr. Haynes is agreeable to patient infusing weekly at the office and receiving her weekly line care and lab draws at that time. D/w patient and she states she is agreeable to weekly office visits. Patient's f/u appt is scheduled with Dr. Haynes on Thursday, 12/20, at 1230. This has been added to the AVS.    Final Discharge Disposition Code  01 - home or self-care        Discharge Codes    No documentation.       Expected Discharge Date and Time     Expected Discharge Date Expected Discharge Time    Dec 7, 2018             Valarie Bal RN     Discarded in the usual manner

## 2021-11-16 NOTE — OB PROVIDER H&P - HISTORY OF PRESENT ILLNESS
37yo  @ 40.2 weeks (OVIDIO ) presents with PROM, clear, from 1245a. Pregnancy complicated by GDMA1. Pt desires TOLAC. +FM, -VB, -CTX    GBS positve  EFW 3900    OBHx:  -  FT c/s   -    -    -  VBCA  - largest 8#13, smallest 8#2  GYNHx: No ovarian cysts, fibroids, hx of abnormal pap or STDs  PMHx: No hx of DM, HTN, asthma, bleeding or clotting disorders or blood transfusions.  PSurgHx: c/s x1  Allergies: NKDA  Meds: None  Social: No smoking, alcohol, or illicit drug use during pregnancy   Psych: No hx of anxiety or depression

## 2021-11-16 NOTE — OB PROVIDER DELIVERY SUMMARY - NSLACERATCATEGORY_OBGYN_ALL_OB
Second Degree
I have reviewed and confirmed nurses' notes for patient's medications, allergies, medical history, and surgical history.

## 2021-11-16 NOTE — OB PROVIDER H&P - ASSESSMENT
A/P: 35yo  @ 40.2 weeks for PROM IOL, desiring TOLAC  - Admit to L&D  - Routine labs   - Diabetic rotating fluids   - Monitor FS  - COVID swab for PCR  - EFM/TOCO  - IVH/NPO  - Anesthesia consult   - Bicitra  - Amp for GBS ppx  - Pitocin for augmetation  - Expect     dw Dr Cory Casas PAC

## 2021-11-16 NOTE — OB RN PATIENT PROFILE - TEMPERATURE IN CELSIUS (DEGREES C)
Date: 2/15/2018    Time of Call: 1:36 PM     Diagnosis:  Heart failure     [ VORB ] Ordering provider: Sugey Levi  Order: Xarelto 20 mg once daily     Order received by: Verónica Jarquin RN             36.6

## 2021-11-16 NOTE — OB RN DELIVERY SUMMARY - NSSELHIDDEN_OBGYN_ALL_OB_FT
[NS_DeliveryAttending1_OBGYN_ALL_OB_FT:[id number]],[NS_DeliveryAssist1_OBGYN_ALL_OB_FT:[id number]] [NS_DeliveryAttending1_OBGYN_ALL_OB_FT:[id number]=],[NS_DeliveryAssist1_OBGYN_ALL_OB_FT:[email or web address]=],[NS_DeliveryRN_OBGYN_ALL_OB_FT:[id number]=]

## 2021-11-16 NOTE — OB PROVIDER DELIVERY SUMMARY - NSPROVIDERDELIVERYNOTE_OBGYN_ALL_OB_FT
Pt became fully dilated and desired to push.   Spontaneous vaginal delivery of liveborn male.  Head delivered, nuchal x1, reduced easily. Shoulders and body delivered easily following restitution.   Cord was delayed 1 minute. Cord was cut.  Infant was passed to mother.  Placenta delivered spontaneously intact. Uterine massage was performed and pitocin was given.  Fundus was firm. Second degree laceration repaired with vicryl.  Good hemostasis was noted.  Count correct x2. Mother and baby resting comfortably. Pt became fully dilated and desired to push.   Spontaneous vaginal delivery of liveborn male.  Head delivered, nuchal x1, reduced easily. Shoulders and body delivered easily following restitution.   infant placed on mother's abdomen. Delayed cord clamping x 1 minute. Cord was clamped and cut.  Infant was passed to mother. Placenta delivered spontaneously intact. Uterine massage was performed and pitocin was given. Fundus was firm. Second degree laceration repaired with 2-0 vicryl rapide.  Good hemostasis was noted.  Count correct x2. Mother and baby resting comfortably.

## 2021-11-16 NOTE — OB PROVIDER LABOR PROGRESS NOTE - ASSESSMENT
35yo P4 @ 40+wks, a/w PROM, augmented with pitocin, TOLAC  prior successful  x3 (first delivery was CD)  cont epidural  maternal and fetal status overall reassuring  anticipate sylvia orellana MD  37yo P4 @ 40+wks, a/w PROM, augmented with pitocin, TOLAC  prior successful  x3 (first delivery was CD)  cont epidural  gDM-a1, monitor blood sugars  maternal and fetal status overall reassuring  anticipate sylvia orellana MD

## 2021-11-16 NOTE — OB PROVIDER LABOR PROGRESS NOTE - NS_SUBJECTIVE/OBJECTIVE_OBGYN_ALL_OB_FT
pt c/o mild pain
feeling some pressure, assessed for change
PA Note:  Patient seen and evaluated at bedside for VE. Patient comfortable with epidural in place.

## 2021-11-16 NOTE — OB PROVIDER LABOR PROGRESS NOTE - ASSESSMENT
A/P:  -EFM/[place]  -Continue pitocin  -Continue ampicillin for gbs ppx  -Anticipate   D/w Dr. Cory Gamboa PA-C

## 2021-11-16 NOTE — PRE-ANESTHESIA EVALUATION ADULT - NSANTHOSAYNRD_GEN_A_CORE
No. JOSE D screening performed.  STOP BANG Legend: 0-2 = LOW Risk; 3-4 = INTERMEDIATE Risk; 5-8 = HIGH Risk

## 2021-11-16 NOTE — OB PROVIDER DELIVERY SUMMARY - NSPPHRISKEVAL_OBGYN_ALL_OB
Grand parity (>4 full term pregnancies)/Known lacerations/Prior , uterine surgery, or multiple laparotomies

## 2021-11-16 NOTE — OB RN DELIVERY SUMMARY - NS_SEPSISRSKCALC_OBGYN_ALL_OB_FT
EOS calculated successfully. EOS Risk Factor: 0.5/1000 live births ([facility] national incidence); GA=40w2d; Temp=97.9; ROM=9.25; GBS='Positive'; Antibiotics='Broad spectrum antibiotics > 4 hrs prior to birth'

## 2021-11-16 NOTE — OB PROVIDER DELIVERY SUMMARY - NSSELHIDDEN_OBGYN_ALL_OB_FT
[NS_DeliveryAttending1_OBGYN_ALL_OB_FT:[id number]=],[NS_DeliveryAssist1_OBGYN_ALL_OB_FT:[email or web address]=]

## 2021-11-16 NOTE — OB RN PATIENT PROFILE - PARENTS VERBALIZED UNDERSTANDING OF THE SAFE SKIN TO SKIN POSITIONING OF THE NEWBORN.
Principal Discharge DX:	Closed head injury, initial encounter  Secondary Diagnosis:	Contusion of face, initial encounter  Secondary Diagnosis:	Contusion of shoulder, initial encounter Statement Selected

## 2021-11-16 NOTE — OB PROVIDER H&P - NSHPPHYSICALEXAM_GEN_ALL_CORE
PE:  T(C): --  HR: 92 (11-16-21 @ 02:23) (92 - 109)  BP: 128/73 (11-16-21 @ 02:04) (128/73 - 128/73)  RR: --  SpO2: 97% (11-16-21 @ 02:23) (97% - 98%)  General: NAD, A&Ox3  CV: RRR  Lungs: Clear bilat   Abd: soft, nontender, gravid  VE: 2/70/-3, grossly ruptured, examined by MD Ray  EFM: 135/moderate variablity/+accels/-decels  TOCO: irregular  BSUS: vertex

## 2021-11-17 ENCOUNTER — APPOINTMENT (OUTPATIENT)
Dept: ANTEPARTUM | Facility: CLINIC | Age: 36
End: 2021-11-17

## 2021-11-17 RX ADMIN — Medication 975 MILLIGRAM(S): at 20:11

## 2021-11-17 RX ADMIN — Medication 600 MILLIGRAM(S): at 11:10

## 2021-11-17 RX ADMIN — Medication 975 MILLIGRAM(S): at 14:10

## 2021-11-17 RX ADMIN — Medication 600 MILLIGRAM(S): at 00:36

## 2021-11-17 RX ADMIN — Medication 600 MILLIGRAM(S): at 18:10

## 2021-11-17 RX ADMIN — Medication 600 MILLIGRAM(S): at 23:43

## 2021-11-17 RX ADMIN — Medication 975 MILLIGRAM(S): at 20:41

## 2021-11-17 RX ADMIN — Medication 600 MILLIGRAM(S): at 00:06

## 2021-11-17 RX ADMIN — Medication 600 MILLIGRAM(S): at 17:43

## 2021-11-17 RX ADMIN — Medication 975 MILLIGRAM(S): at 14:40

## 2021-11-17 RX ADMIN — Medication 1 TABLET(S): at 11:09

## 2021-11-17 RX ADMIN — Medication 975 MILLIGRAM(S): at 03:04

## 2021-11-17 RX ADMIN — Medication 975 MILLIGRAM(S): at 02:34

## 2021-11-17 RX ADMIN — Medication 600 MILLIGRAM(S): at 11:40

## 2021-11-17 NOTE — CHART NOTE - NSCHARTNOTEFT_GEN_A_CORE
Patient seen for: nutrition consult for GDM postpartum education prior to discharge    Source: patient, medical record.    Patient is a 36 year old female   Admission date: 11/16/2021  Antepartum course significant for GDMA1. No prior hx of GDM    Pt plans on breastfeeding infant.    Chart reviewed, events noted. Meds/Labs reviewed.  Meds: IV d5 + sodium chloride 0.9% 1000ml, prenatal vitamin, simethicone, magnesium hydroxide.  Labs: none nutritionally relevant.    Anthropometrics:  Height: 62 inches  Pre-pregnancy weight: 138 pounds   Weight gain: 37 pounds   Admit/Dosing wt: 175 pounds     Nutrition Diagnosis:   Food and Nutrition Related Knowledge Deficit related to lack of prior knowledge of post-partum GDM nutrition recommendations as evidenced by pt unable to state teach back points, pt with no prior history of GDM, pt PPD #1.       Goal: Pt able to teach back 2 points of nutrition education provided.     Nutrition Intervention:  Post-partum GDM diet education provided to patient at bedside:   1) Increased risk of developing T2DM with GDM and ways to help prevent development  2) 4-12 week fasting oral glucose tolerance test follow-up as outpatient  3) General healthful diet and physical activity recommendations discussed  4) Increased energy needs with breastfeeding    After-delivery GDM education handout provided.      Monitoring:  No other nutrition risks were identified during this encounter.  RD remains available upon request and will follow up per protocol.  B Jamila Mack, Dietetic Intern, Pager# [phone]

## 2021-11-18 ENCOUNTER — TRANSCRIPTION ENCOUNTER (OUTPATIENT)
Age: 36
End: 2021-11-18

## 2021-11-18 VITALS
DIASTOLIC BLOOD PRESSURE: 64 MMHG | RESPIRATION RATE: 18 BRPM | SYSTOLIC BLOOD PRESSURE: 109 MMHG | TEMPERATURE: 98 F | OXYGEN SATURATION: 98 % | HEART RATE: 64 BPM

## 2021-11-18 PROCEDURE — 86769 SARS-COV-2 COVID-19 ANTIBODY: CPT

## 2021-11-18 PROCEDURE — 87635 SARS-COV-2 COVID-19 AMP PRB: CPT

## 2021-11-18 PROCEDURE — 59050 FETAL MONITOR W/REPORT: CPT

## 2021-11-18 PROCEDURE — 82962 GLUCOSE BLOOD TEST: CPT

## 2021-11-18 PROCEDURE — G0463: CPT

## 2021-11-18 PROCEDURE — 86850 RBC ANTIBODY SCREEN: CPT

## 2021-11-18 PROCEDURE — 85025 COMPLETE CBC W/AUTO DIFF WBC: CPT

## 2021-11-18 PROCEDURE — 86901 BLOOD TYPING SEROLOGIC RH(D): CPT

## 2021-11-18 PROCEDURE — 86780 TREPONEMA PALLIDUM: CPT

## 2021-11-18 PROCEDURE — 86900 BLOOD TYPING SEROLOGIC ABO: CPT

## 2021-11-18 PROCEDURE — 59025 FETAL NON-STRESS TEST: CPT

## 2021-11-18 RX ORDER — IBUPROFEN 200 MG
1 TABLET ORAL
Qty: 0 | Refills: 0 | DISCHARGE
Start: 2021-11-18

## 2021-11-18 RX ORDER — ACETAMINOPHEN 500 MG
3 TABLET ORAL
Qty: 0 | Refills: 0 | DISCHARGE
Start: 2021-11-18

## 2021-11-18 RX ADMIN — Medication 600 MILLIGRAM(S): at 00:13

## 2021-11-18 RX ADMIN — Medication 975 MILLIGRAM(S): at 08:57

## 2021-11-18 RX ADMIN — Medication 975 MILLIGRAM(S): at 02:38

## 2021-11-18 RX ADMIN — Medication 1 TABLET(S): at 11:16

## 2021-11-18 RX ADMIN — Medication 975 MILLIGRAM(S): at 03:08

## 2021-11-18 RX ADMIN — Medication 975 MILLIGRAM(S): at 08:27

## 2021-11-18 RX ADMIN — Medication 600 MILLIGRAM(S): at 11:46

## 2021-11-18 RX ADMIN — Medication 600 MILLIGRAM(S): at 11:16

## 2021-11-18 NOTE — DISCHARGE NOTE OB - CARE PROVIDER_API CALL
Josselin Melton  OBSTETRICS AND GYNECOLOGY  [address]  [place] [address]  Phone: [phone]  Fax: [phone]  Follow Up Time:

## 2021-11-18 NOTE — PROGRESS NOTE ADULT - ASSESSMENT
A/P: 37yo PPD#2 s/p .  Patient is stable and doing well post-partum.    - Pain well controlled, continue current pain regimen  - Increase ambulation, SCDs when not ambulating  - Continue regular diet  - Discharge planning     Nikia Zhou, PGY1  
  A/P: 35yo PPD#1 s/p .  Patient is stable and doing well post-partum.   - Pain well controlled, continue current pain regimen  - Increase ambulation, SCDs when not ambulating  - Continue regular diet    Nikia Zhou, PGY1

## 2021-11-18 NOTE — DISCHARGE NOTE OB - PATIENT PORTAL LINK FT
You can access the FollowMyHealth Patient Portal offered by [facility] by registering at the following website: [email or web address]. By joining [employer]’s FollowMyHealth portal, you will also be able to view your health information using other applications (apps) compatible with our system.

## 2021-11-18 NOTE — DISCHARGE NOTE OB - CARE PLAN
1 Principal Discharge DX:	, delivered  Assessment and plan of treatment:	return to baseline health, regular diet, pain control, follow up with Dr Melton

## 2021-11-18 NOTE — PROGRESS NOTE ADULT - SUBJECTIVE AND OBJECTIVE BOX
OB Progress Note:  PPD#1    S: 37yo  PPD#1 s/p . Patient feels well. Pain is well controlled. She is tolerating a regular diet and passing flatus. She is voiding spontaneously, and ambulating without difficulty. Denies CP/SOB. Denies lightheadedness/dizziness. Denies N/V.    O:  Vitals:  Vital Signs Last 24 Hrs  T(C): 37 (2021 05:32), Max: 37 (2021 17:31)  T(F): 98.6 (2021 05:32), Max: 98.6 (2021 17:31)  HR: 62 (2021 05:32) (62 - 107)  BP: 101/66 (2021 05:32) (101/66 - 132/70)  BP(mean): 85 (2021 12:10) (75 - 90)  RR: 18 (2021 05:32) (17 - 18)  SpO2: 97% (2021 05:32) (91% - 100%)    MEDICATIONS  (STANDING):  acetaminophen     Tablet .. 975 milliGRAM(s) Oral <User Schedule>  dextrose 5% + sodium chloride 0.9%. 1000 milliLiter(s) (125 mL/Hr) IV Continuous <Continuous>  diphtheria/tetanus/pertussis (acellular) Vaccine (ADAcel) 0.5 milliLiter(s) IntraMuscular once  ibuprofen  Tablet. 600 milliGRAM(s) Oral every 6 hours  oxytocin Infusion 333.333 milliUNIT(s)/Min (1000 mL/Hr) IV Continuous <Continuous>  oxytocin Infusion 333.333 milliUNIT(s)/Min (1000 mL/Hr) IV Continuous <Continuous>  prenatal multivitamin 1 Tablet(s) Oral daily  sodium chloride 0.9% lock flush 3 milliLiter(s) IV Push every 8 hours  sodium chloride 0.9%. 1000 milliLiter(s) (125 mL/Hr) IV Continuous <Continuous>      Labs:  Blood type: A Positive  Rubella IgG: RPR: Negative                          11.0<L>   10.08 >-----------< 193    ( 11-16 @ 02:47 )             33.8<L>                  Physical Exam:  General: NAD  Abdomen: soft, non-tender, non-distended, fundus firm  Vaginal: Lochia wnl  Extremities: No erythema/edema  
OB Progress Note:  PPD#2    S: 37yo PPD#2 s/p . Patient feels well. Pain is well controlled. She is tolerating a regular diet and passing flatus. She is voiding spontaneously, and ambulating without difficulty. Denies CP/SOB. Denies lightheadedness/dizziness. Denies N/V.    O:  Vitals:   Vital Signs Last 24 Hrs  T(C): 36.9 (2021 06:05), Max: 36.9 (2021 06:05)  T(F): 98.4 (2021 06:05), Max: 98.4 (2021 06:05)  HR: 63 (2021 06:05) (63 - 70)  BP: 103/66 (2021 06:05) (99/66 - 103/66)  BP(mean): --  RR: 18 (2021 06:05) (18 - 18)  SpO2: 97% (2021 06:05) (97% - 98%)    MEDICATIONS  (STANDING):  acetaminophen     Tablet .. 975 milliGRAM(s) Oral <User Schedule>  dextrose 5% + sodium chloride 0.9%. 1000 milliLiter(s) (125 mL/Hr) IV Continuous <Continuous>  diphtheria/tetanus/pertussis (acellular) Vaccine (ADAcel) 0.5 milliLiter(s) IntraMuscular once  ibuprofen  Tablet. 600 milliGRAM(s) Oral every 6 hours  oxytocin Infusion 333.333 milliUNIT(s)/Min (1000 mL/Hr) IV Continuous <Continuous>  oxytocin Infusion 333.333 milliUNIT(s)/Min (1000 mL/Hr) IV Continuous <Continuous>  prenatal multivitamin 1 Tablet(s) Oral daily  sodium chloride 0.9% lock flush 3 milliLiter(s) IV Push every 8 hours  sodium chloride 0.9%. 1000 milliLiter(s) (125 mL/Hr) IV Continuous <Continuous>    MEDICATIONS  (PRN):  benzocaine 20%/menthol 0.5% Spray 1 Spray(s) Topical every 6 hours PRN for Perineal discomfort  dibucaine 1% Ointment 1 Application(s) Topical every 6 hours PRN Perineal discomfort  diphenhydrAMINE 25 milliGRAM(s) Oral every 6 hours PRN Pruritus  hydrocortisone 1% Cream 1 Application(s) Topical every 6 hours PRN Moderate Pain (4-6)  lanolin Ointment 1 Application(s) Topical every 6 hours PRN nipple soreness  magnesium hydroxide Suspension 30 milliLiter(s) Oral two times a day PRN Constipation  pramoxine 1%/zinc 5% Cream 1 Application(s) Topical every 4 hours PRN Moderate Pain (4-6)  simethicone 80 milliGRAM(s) Chew every 4 hours PRN Gas  witch hazel Pads 1 Application(s) Topical every 4 hours PRN Perineal discomfort      Labs:  Blood type: A Positive  Rubella IgG: RPR: Negative                          11.0<L>   10.08 >-----------< 193    ( 11-16 @ 02:47 )             33.8<L>      Physical Exam:  General: NAD  Abdomen: soft, non-tender, non-distended, fundus firm  Vaginal: Lochia wnl  Extremities: No erythema/edema

## 2021-12-01 NOTE — OB RN PATIENT PROFILE - ABORTIONS, OB PROFILE
Patient here for 1 of 3 solu-medrol. Tolerated w/o incident and left in stable condition. Left IV accessed.
0

## 2021-12-13 NOTE — OB RN PATIENT PROFILE - NSICDXNOPASTSURGICALHX_GEN_ALL_CORE
A/P:  - anticipate   - peanut ball in place  - EFM Cat II, moderate variability, overall reassuring    AGAPITO DeeC <-- Click to add NO significant Past Surgical History

## 2022-01-05 ENCOUNTER — APPOINTMENT (OUTPATIENT)
Dept: OBGYN | Facility: CLINIC | Age: 37
End: 2022-01-05

## 2022-03-21 NOTE — OB RN PATIENT PROFILE - SPO2 (%)
98 Azathioprine Counseling:  I discussed with the patient the risks of azathioprine including but not limited to myelosuppression, immunosuppression, hepatotoxicity, lymphoma, and infections.  The patient understands that monitoring is required including baseline LFTs, Creatinine, possible TPMP genotyping and weekly CBCs for the first month and then every 2 weeks thereafter.  The patient verbalized understanding of the proper use and possible adverse effects of azathioprine.  All of the patient's questions and concerns were addressed.

## 2022-08-03 ENCOUNTER — RESULT REVIEW (OUTPATIENT)
Age: 37
End: 2022-08-03